# Patient Record
Sex: FEMALE | Race: WHITE | Employment: FULL TIME | URBAN - NONMETROPOLITAN AREA
[De-identification: names, ages, dates, MRNs, and addresses within clinical notes are randomized per-mention and may not be internally consistent; named-entity substitution may affect disease eponyms.]

---

## 2022-02-01 ENCOUNTER — HOSPITAL ENCOUNTER (EMERGENCY)
Age: 52
Discharge: HOME HEALTH | End: 2022-02-01
Payer: COMMERCIAL

## 2022-02-01 VITALS
HEART RATE: 96 BPM | DIASTOLIC BLOOD PRESSURE: 108 MMHG | TEMPERATURE: 99.3 F | HEIGHT: 67 IN | OXYGEN SATURATION: 95 % | WEIGHT: 230 LBS | SYSTOLIC BLOOD PRESSURE: 183 MMHG | RESPIRATION RATE: 20 BRPM | BODY MASS INDEX: 36.1 KG/M2

## 2022-02-01 DIAGNOSIS — R03.0 ELEVATED BLOOD PRESSURE READING: ICD-10-CM

## 2022-02-01 DIAGNOSIS — S61.310A LACERATION OF RIGHT INDEX FINGER WITHOUT FOREIGN BODY WITH DAMAGE TO NAIL, INITIAL ENCOUNTER: Primary | ICD-10-CM

## 2022-02-01 PROCEDURE — 99283 EMERGENCY DEPT VISIT LOW MDM: CPT

## 2022-02-01 ASSESSMENT — ENCOUNTER SYMPTOMS: COLOR CHANGE: 0

## 2022-02-01 ASSESSMENT — PAIN SCALES - GENERAL: PAINLEVEL_OUTOF10: 3

## 2022-02-02 NOTE — ED NOTES
Pt AVS reviewed, pt expressed understanding. Pt d/c at this time.       Kevyn Elmore RN  02/01/22 4155

## 2022-02-02 NOTE — ED PROVIDER NOTES
[facility]        Pt Name: Isabella Spencer  MRN: [id number]  [place] 1970  Date of evaluation: 2/1/2022  Provider: Jessika Ko PA-C  PCP: Geena Bowers MD    Shared Visit or Autonomous Visit: JAN. I have evaluated this patient. My supervising physician was available for consultation. CHIEF COMPLAINT       Chief Complaint   Patient presents with    Laceration     Pt states she sliced her L index finger tonight. Bleeding controlled with pressure. HISTORY OF PRESENT ILLNESS   (Location/Symptom, Timing/Onset, Context/Setting, Quality, Duration, Modifying Factors, Severity)  Note limiting factors. Isabella Spencer is a 45 y.o. female presenting to emergency department for evaluation of right index finger laceration accidentally cut her finger tap on a mandolin slicer avulsion type injury to the skin. Cleaned and bandaged wound at home but still bleeding came in for evaluation. Mild active oozing bleeding on exam controlled with pressure. Unsure of last tetanus. The history is provided by the patient. Laceration  Location:  Finger  Finger laceration location:  R index finger  Length:  1x1cm  Depth: Through dermis  Quality: straight    Bleeding: controlled with pressure    Time since incident:  2 hours  Laceration mechanism:  Metal edge  Foreign body present:  No foreign bodies  Tetanus status:  Unknown  Associated symptoms: no fever, no focal weakness, no numbness and no redness        Nursing Notes were reviewed    REVIEW OF SYSTEMS    (2-9 systems for level 4, 10 or more for level 5)     Review of Systems   Constitutional: Negative for fever. Skin: Positive for wound. Negative for color change. Neurological: Negative for focal weakness, weakness and numbness. Positives and Pertinent negatives as per HPI.        PAST MEDICAL HISTORY     Past Medical History:   Diagnosis Date    Diabetes mellitus ([facility] [address]     Hypertension     Physical Activity:     Days of Exercise per Week: Not on file    Minutes of Exercise per Session: Not on file   Stress:     Feeling of Stress : Not on file   Social Connections:     Frequency of Communication with Friends and Family: Not on file    Frequency of Social Gatherings with Friends and Family: Not on file    Attends Yazdan[employer] Services: Not on file    Active Member of [address] [place] [address] [employer] or Organizations: Not on file    Attends Club or Organization Meetings: Not on file    Marital Status: Not on file   Intimate Partner Violence:     Fear of Current or Ex-Partner: Not on file    Emotionally Abused: Not on file    Physically Abused: Not on file    Sexually Abused: Not on file   Housing Stability:     Unable to Pay for Housing in the Last Year: Not on file    Number of [place] in the Last Year: Not on file    Unstable Housing in the Last Year: Not on file       SCREENINGS             PHYSICAL EXAM    (up to 7 for level 4, 8 or more for level 5)     ED Triage Vitals [02/01/22 2008]   BP Temp Temp Source Pulse Resp SpO2 Height Weight   (!) 176/94 99.3 °F (37.4 °C) Oral 86 19 95 % 5' 7\" (1.702 m) 230 lb (104.3 kg)       Physical Exam  Vitals and nursing note reviewed. Constitutional:       Appearance: She is well-developed. She is not ill-appearing or toxic-appearing. HENT:      Head: Normocephalic and atraumatic. Cardiovascular:      Pulses: Normal pulses. Radial pulses are 2+ on the right side. Pulmonary:      Effort: Pulmonary effort is normal. No respiratory distress. Musculoskeletal:      Right hand: Laceration and tenderness present. Normal strength. Normal sensation. Normal capillary refill. Normal pulse. Hands:    Skin:     General: Skin is warm and dry. Capillary Refill: Capillary refill takes less than 2 seconds. Neurological:      Mental Status: She is alert and oriented to person, place, and time. Sensory: Sensation is intact.       Motor: Motor function is intact. No abnormal muscle tone. Psychiatric:         Behavior: Behavior normal.         DIAGNOSTIC RESULTS   LABS:    Labs Reviewed - No data to display    All other labs were within normal range or not returned as of this dictation. EKG: All EKG's are interpreted by the Emergency Department Physician in the absence of a cardiologist.  Please see their note for interpretation of EKG. RADIOLOGY:   Non-plain film images such as CT, Ultrasound and MRI are read by the radiologist. Plain radiographic images are visualized andpreliminarily interpreted by the  ED Provider with the below findings:        Interpretation perthe Radiologist below, if available at the time of this note:    No orders to display     No results found. PROCEDURES   Unless otherwise noted below, none     Procedures    CRITICAL CARE TIME   N/A    CONSULTS:  None      EMERGENCY DEPARTMENT COURSE and DIFFERENTIAL DIAGNOSIS/MDM:   Vitals:    Vitals:    02/01/22 2008 02/01/22 2032 02/01/22 2141   BP: (!) 176/94 (!) 164/90 (!) 183/108   Pulse: 86 84 96   Resp: 19 22 20   Temp: 99.3 °F (37.4 °C)     TempSrc: Oral     SpO2: 95% 95% 95%   Weight: 230 lb (104.3 kg)     Height: 5' 7\" (1.702 m)         Patient was given thefollowing medications:  Medications   Tetanus-Diphth-Acell Pertussis (BOOSTRIX) injection 0.5 mL (0.5 mLs IntraMUSCular Not Given 2/1/22 2140)       ED course  Patient presented to the ER for evaluation of right index finger laceration. Accidentally sliced her fingertip on a mandolin slicer at home she has 1 x 1 cm avulsion of skin with mild active oozing bleeding to the fingertip. Bleeding is controlled with pressure. Intact sensation. Distal finger is pink and warm with good capillary refill. Discussed with her unfortunately with avulsion type injury unable to suture but we will apply surgifoam to stop bleeding and then gauze dressing and tetanus will be updated. Advised wound check with her doctor within 1 week.